# Patient Record
Sex: MALE | Race: WHITE
[De-identification: names, ages, dates, MRNs, and addresses within clinical notes are randomized per-mention and may not be internally consistent; named-entity substitution may affect disease eponyms.]

---

## 2018-02-15 ENCOUNTER — HOSPITAL ENCOUNTER (OUTPATIENT)
Dept: HOSPITAL 95 - ORSCSDS | Age: 83
Discharge: HOME | End: 2018-02-15
Payer: MEDICARE

## 2018-02-15 VITALS — BODY MASS INDEX: 28.35 KG/M2 | HEIGHT: 70 IN | WEIGHT: 198 LBS

## 2018-02-15 DIAGNOSIS — G56.01: Primary | ICD-10-CM

## 2018-02-15 DIAGNOSIS — E11.9: ICD-10-CM

## 2018-02-15 DIAGNOSIS — Z87.891: ICD-10-CM

## 2018-02-15 DIAGNOSIS — Z79.82: ICD-10-CM

## 2018-02-15 DIAGNOSIS — Z79.899: ICD-10-CM

## 2018-02-15 DIAGNOSIS — I10: ICD-10-CM

## 2018-02-15 DIAGNOSIS — Z79.84: ICD-10-CM

## 2018-02-15 PROCEDURE — 01N54ZZ RELEASE MEDIAN NERVE, PERCUTANEOUS ENDOSCOPIC APPROACH: ICD-10-PCS

## 2018-12-16 ENCOUNTER — HOSPITAL ENCOUNTER (INPATIENT)
Dept: HOSPITAL 95 - ER | Age: 83
LOS: 4 days | Discharge: HOME | DRG: 309 | End: 2018-12-20
Attending: HOSPITALIST | Admitting: HOSPITALIST
Payer: MEDICARE

## 2018-12-16 VITALS — BODY MASS INDEX: 24.08 KG/M2 | WEIGHT: 168.21 LBS | HEIGHT: 70 IN

## 2018-12-16 DIAGNOSIS — C91.10: ICD-10-CM

## 2018-12-16 DIAGNOSIS — I11.0: ICD-10-CM

## 2018-12-16 DIAGNOSIS — I50.22: ICD-10-CM

## 2018-12-16 DIAGNOSIS — E03.9: ICD-10-CM

## 2018-12-16 DIAGNOSIS — Z79.899: ICD-10-CM

## 2018-12-16 DIAGNOSIS — I25.10: ICD-10-CM

## 2018-12-16 DIAGNOSIS — D61.818: ICD-10-CM

## 2018-12-16 DIAGNOSIS — D64.9: ICD-10-CM

## 2018-12-16 DIAGNOSIS — Z87.891: ICD-10-CM

## 2018-12-16 DIAGNOSIS — K92.2: ICD-10-CM

## 2018-12-16 DIAGNOSIS — I27.20: ICD-10-CM

## 2018-12-16 DIAGNOSIS — G47.33: ICD-10-CM

## 2018-12-16 DIAGNOSIS — Z88.8: ICD-10-CM

## 2018-12-16 DIAGNOSIS — I48.0: Primary | ICD-10-CM

## 2018-12-16 DIAGNOSIS — E11.9: ICD-10-CM

## 2018-12-16 DIAGNOSIS — K31.5: ICD-10-CM

## 2018-12-16 DIAGNOSIS — E78.5: ICD-10-CM

## 2018-12-16 DIAGNOSIS — J96.11: ICD-10-CM

## 2018-12-16 DIAGNOSIS — K22.2: ICD-10-CM

## 2018-12-16 DIAGNOSIS — I95.9: ICD-10-CM

## 2018-12-16 DIAGNOSIS — I42.9: ICD-10-CM

## 2018-12-16 DIAGNOSIS — Z79.82: ICD-10-CM

## 2018-12-16 LAB
ALBUMIN SERPL BCP-MCNC: 2.7 G/DL (ref 3.4–5)
ALBUMIN/GLOB SERPL: 0.4 {RATIO} (ref 0.8–1.8)
ALT SERPL W P-5'-P-CCNC: 14 U/L (ref 12–78)
ANION GAP SERPL CALCULATED.4IONS-SCNC: 8 MMOL/L (ref 6–16)
AST SERPL W P-5'-P-CCNC: 22 U/L (ref 12–37)
BASOPHILS # BLD AUTO: 0.01 K/MM3 (ref 0–0.23)
BASOPHILS NFR BLD AUTO: 0 % (ref 0–2)
BILIRUB SERPL-MCNC: 0.2 MG/DL (ref 0.1–1)
BUN SERPL-MCNC: 16 MG/DL (ref 8–24)
CALCIUM SERPL-MCNC: 8.3 MG/DL (ref 8.5–10.1)
CHLORIDE SERPL-SCNC: 103 MMOL/L (ref 98–108)
CO2 SERPL-SCNC: 25 MMOL/L (ref 21–32)
CREAT SERPL-MCNC: 1.04 MG/DL (ref 0.6–1.2)
DEPRECATED RDW RBC AUTO: 71.9 FL (ref 35.1–46.3)
EOSINOPHIL # BLD AUTO: 0.07 K/MM3 (ref 0–0.68)
EOSINOPHIL NFR BLD AUTO: 1 % (ref 0–6)
ERYTHROCYTE [DISTWIDTH] IN BLOOD BY AUTOMATED COUNT: 18.5 % (ref 11.7–14.2)
GLOBULIN SER CALC-MCNC: 6.3 G/DL (ref 2.2–4)
GLUCOSE SERPL-MCNC: 160 MG/DL (ref 70–99)
HCT VFR BLD AUTO: 25.1 % (ref 37–53)
HGB BLD-MCNC: 7.6 G/DL (ref 13.5–17.5)
IMM GRANULOCYTES # BLD AUTO: 0.02 K/MM3 (ref 0–0.1)
IMM GRANULOCYTES NFR BLD AUTO: 0 % (ref 0–1)
LYMPHOCYTES # BLD AUTO: 2.77 K/MM3 (ref 0.84–5.2)
LYMPHOCYTES NFR BLD AUTO: 44 % (ref 21–46)
MCHC RBC AUTO-ENTMCNC: 30.3 G/DL (ref 31.5–36.5)
MCV RBC AUTO: 106 FL (ref 80–100)
MONOCYTES # BLD AUTO: 0.36 K/MM3 (ref 0.16–1.47)
MONOCYTES NFR BLD AUTO: 6 % (ref 4–13)
NEUTROPHILS # BLD AUTO: 3.05 K/MM3 (ref 1.96–9.15)
NEUTROPHILS NFR BLD AUTO: 49 % (ref 41–73)
NRBC # BLD AUTO: 0 K/MM3 (ref 0–0.02)
NRBC BLD AUTO-RTO: 0 /100 WBC (ref 0–0.2)
PLATELET # BLD AUTO: 203 K/MM3 (ref 150–400)
POTASSIUM SERPL-SCNC: 4.1 MMOL/L (ref 3.5–5.5)
PROT SERPL-MCNC: 9 G/DL (ref 6.4–8.2)
PROTHROMBIN TIME: 11.3 SEC (ref 9.7–11.5)
SODIUM SERPL-SCNC: 136 MMOL/L (ref 136–145)
TROPONIN I SERPL-MCNC: 0.1 NG/ML (ref 0–0.04)

## 2018-12-16 PROCEDURE — G8978 MOBILITY CURRENT STATUS: HCPCS

## 2018-12-16 PROCEDURE — 5A2204Z RESTORATION OF CARDIAC RHYTHM, SINGLE: ICD-10-PCS | Performed by: FAMILY MEDICINE

## 2018-12-16 PROCEDURE — G0378 HOSPITAL OBSERVATION PER HR: HCPCS

## 2018-12-16 PROCEDURE — G8980 MOBILITY D/C STATUS: HCPCS

## 2018-12-16 PROCEDURE — P9016 RBC LEUKOCYTES REDUCED: HCPCS

## 2018-12-16 PROCEDURE — G8979 MOBILITY GOAL STATUS: HCPCS

## 2018-12-16 PROCEDURE — C9113 INJ PANTOPRAZOLE SODIUM, VIA: HCPCS

## 2018-12-17 LAB
ALBUMIN SERPL BCP-MCNC: 2.6 G/DL (ref 3.4–5)
ALBUMIN/GLOB SERPL: 0.4 {RATIO} (ref 0.8–1.8)
ALT SERPL W P-5'-P-CCNC: 16 U/L (ref 12–78)
ANION GAP SERPL CALCULATED.4IONS-SCNC: 7 MMOL/L (ref 6–16)
AST SERPL W P-5'-P-CCNC: 36 U/L (ref 12–37)
BASOPHILS # BLD AUTO: 0.01 K/MM3 (ref 0–0.23)
BASOPHILS NFR BLD AUTO: 0 % (ref 0–2)
BILIRUB SERPL-MCNC: 0.5 MG/DL (ref 0.1–1)
BUN SERPL-MCNC: 13 MG/DL (ref 8–24)
CALCIUM SERPL-MCNC: 8.6 MG/DL (ref 8.5–10.1)
CHLORIDE SERPL-SCNC: 105 MMOL/L (ref 98–108)
CK SERPL-CCNC: 172 U/L (ref 39–308)
CK SERPL-CCNC: 234 U/L (ref 39–308)
CO2 SERPL-SCNC: 27 MMOL/L (ref 21–32)
CREAT SERPL-MCNC: 0.81 MG/DL (ref 0.6–1.2)
DEPRECATED RDW RBC AUTO: 69.2 FL (ref 35.1–46.3)
DEPRECATED RDW RBC AUTO: 71.3 FL (ref 35.1–46.3)
EOSINOPHIL # BLD AUTO: 0.04 K/MM3 (ref 0–0.68)
EOSINOPHIL NFR BLD AUTO: 1 % (ref 0–6)
ERYTHROCYTE [DISTWIDTH] IN BLOOD BY AUTOMATED COUNT: 18.6 % (ref 11.7–14.2)
ERYTHROCYTE [DISTWIDTH] IN BLOOD BY AUTOMATED COUNT: 18.7 % (ref 11.7–14.2)
GLOBULIN SER CALC-MCNC: 6.7 G/DL (ref 2.2–4)
GLUCOSE SERPL-MCNC: 97 MG/DL (ref 70–99)
HCT VFR BLD AUTO: 26.1 % (ref 37–53)
HCT VFR BLD AUTO: 28.3 % (ref 37–53)
HGB BLD-MCNC: 8.2 G/DL (ref 13.5–17.5)
HGB BLD-MCNC: 8.7 G/DL (ref 13.5–17.5)
IMM GRANULOCYTES # BLD AUTO: 0.02 K/MM3 (ref 0–0.1)
IMM GRANULOCYTES NFR BLD AUTO: 1 % (ref 0–1)
LYMPHOCYTES # BLD AUTO: 1.23 K/MM3 (ref 0.84–5.2)
LYMPHOCYTES NFR BLD AUTO: 34 % (ref 21–46)
MCHC RBC AUTO-ENTMCNC: 30.7 G/DL (ref 31.5–36.5)
MCHC RBC AUTO-ENTMCNC: 31.4 G/DL (ref 31.5–36.5)
MCV RBC AUTO: 103 FL (ref 80–100)
MCV RBC AUTO: 103 FL (ref 80–100)
MONOCYTES # BLD AUTO: 0.16 K/MM3 (ref 0.16–1.47)
MONOCYTES NFR BLD AUTO: 4 % (ref 4–13)
NEUTROPHILS # BLD AUTO: 2.14 K/MM3 (ref 1.96–9.15)
NEUTROPHILS NFR BLD AUTO: 59 % (ref 41–73)
NRBC # BLD AUTO: 0 K/MM3 (ref 0–0.02)
NRBC # BLD AUTO: 0 K/MM3 (ref 0–0.02)
NRBC BLD AUTO-RTO: 0 /100 WBC (ref 0–0.2)
NRBC BLD AUTO-RTO: 0 /100 WBC (ref 0–0.2)
PLATELET # BLD AUTO: 123 K/MM3 (ref 150–400)
PLATELET # BLD AUTO: 141 K/MM3 (ref 150–400)
POTASSIUM SERPL-SCNC: 4.1 MMOL/L (ref 3.5–5.5)
PROT SERPL-MCNC: 9.3 G/DL (ref 6.4–8.2)
SODIUM SERPL-SCNC: 139 MMOL/L (ref 136–145)
SP GR SPEC: 1.01 (ref 1–1.02)
TROPONIN I SERPL-MCNC: 4.71 NG/ML (ref 0–0.04)
TROPONIN I SERPL-MCNC: 8.26 NG/ML (ref 0–0.04)
UROBILINOGEN UR STRIP-MCNC: (no result) MG/DL

## 2018-12-18 LAB
BASOPHILS # BLD AUTO: 0.01 K/MM3 (ref 0–0.23)
BASOPHILS NFR BLD AUTO: 0 % (ref 0–2)
DEPRECATED RDW RBC AUTO: 70.3 FL (ref 35.1–46.3)
EOSINOPHIL # BLD AUTO: 0.1 K/MM3 (ref 0–0.68)
EOSINOPHIL NFR BLD AUTO: 4 % (ref 0–6)
ERYTHROCYTE [DISTWIDTH] IN BLOOD BY AUTOMATED COUNT: 18.6 % (ref 11.7–14.2)
FERRITIN SERPL-MCNC: 108 NG/ML (ref 26–388)
HCT VFR BLD AUTO: 24.7 % (ref 37–53)
HGB BLD-MCNC: 7.5 G/DL (ref 13.5–17.5)
IMM GRANULOCYTES # BLD AUTO: 0.01 K/MM3 (ref 0–0.1)
IMM GRANULOCYTES NFR BLD AUTO: 0 % (ref 0–1)
LYMPHOCYTES # BLD AUTO: 0.96 K/MM3 (ref 0.84–5.2)
LYMPHOCYTES NFR BLD AUTO: 36 % (ref 21–46)
MCHC RBC AUTO-ENTMCNC: 30.4 G/DL (ref 31.5–36.5)
MCV RBC AUTO: 103 FL (ref 80–100)
MONOCYTES # BLD AUTO: 0.16 K/MM3 (ref 0.16–1.47)
MONOCYTES NFR BLD AUTO: 6 % (ref 4–13)
NEUTROPHILS # BLD AUTO: 1.42 K/MM3 (ref 1.96–9.15)
NEUTROPHILS NFR BLD AUTO: 53 % (ref 41–73)
NRBC # BLD AUTO: 0 K/MM3 (ref 0–0.02)
NRBC BLD AUTO-RTO: 0 /100 WBC (ref 0–0.2)
PLATELET # BLD AUTO: 105 K/MM3 (ref 150–400)
TIBC SERPL-MCNC: 270 UG/DL (ref 250–450)

## 2018-12-18 PROCEDURE — 0DBA8ZX EXCISION OF JEJUNUM, VIA NATURAL OR ARTIFICIAL OPENING ENDOSCOPIC, DIAGNOSTIC: ICD-10-PCS | Performed by: INTERNAL MEDICINE

## 2018-12-19 LAB
BASOPHILS # BLD AUTO: 0.01 K/MM3 (ref 0–0.23)
BASOPHILS # BLD AUTO: 0.01 K/MM3 (ref 0–0.23)
BASOPHILS NFR BLD AUTO: 0 % (ref 0–2)
BASOPHILS NFR BLD AUTO: 0 % (ref 0–2)
DEPRECATED RDW RBC AUTO: 67.8 FL (ref 35.1–46.3)
DEPRECATED RDW RBC AUTO: 71.7 FL (ref 35.1–46.3)
EOSINOPHIL # BLD AUTO: 0.06 K/MM3 (ref 0–0.68)
EOSINOPHIL # BLD AUTO: 0.06 K/MM3 (ref 0–0.68)
EOSINOPHIL NFR BLD AUTO: 2 % (ref 0–6)
EOSINOPHIL NFR BLD AUTO: 2 % (ref 0–6)
ERYTHROCYTE [DISTWIDTH] IN BLOOD BY AUTOMATED COUNT: 18.2 % (ref 11.7–14.2)
ERYTHROCYTE [DISTWIDTH] IN BLOOD BY AUTOMATED COUNT: 20.3 % (ref 11.7–14.2)
HCT VFR BLD AUTO: 25.4 % (ref 37–53)
HCT VFR BLD AUTO: 29.1 % (ref 37–53)
HGB BLD-MCNC: 7.9 G/DL (ref 13.5–17.5)
HGB BLD-MCNC: 9.4 G/DL (ref 13.5–17.5)
IMM GRANULOCYTES # BLD AUTO: 0.01 K/MM3 (ref 0–0.1)
IMM GRANULOCYTES # BLD AUTO: 0.01 K/MM3 (ref 0–0.1)
IMM GRANULOCYTES NFR BLD AUTO: 0 % (ref 0–1)
IMM GRANULOCYTES NFR BLD AUTO: 0 % (ref 0–1)
LYMPHOCYTES # BLD AUTO: 0.7 K/MM3 (ref 0.84–5.2)
LYMPHOCYTES # BLD AUTO: 1.34 K/MM3 (ref 0.84–5.2)
LYMPHOCYTES NFR BLD AUTO: 24 % (ref 21–46)
LYMPHOCYTES NFR BLD AUTO: 36 % (ref 21–46)
MCHC RBC AUTO-ENTMCNC: 31.1 G/DL (ref 31.5–36.5)
MCHC RBC AUTO-ENTMCNC: 32.3 G/DL (ref 31.5–36.5)
MCV RBC AUTO: 101 FL (ref 80–100)
MCV RBC AUTO: 97 FL (ref 80–100)
MONOCYTES # BLD AUTO: 0.21 K/MM3 (ref 0.16–1.47)
MONOCYTES # BLD AUTO: 0.26 K/MM3 (ref 0.16–1.47)
MONOCYTES NFR BLD AUTO: 7 % (ref 4–13)
MONOCYTES NFR BLD AUTO: 7 % (ref 4–13)
NEUTROPHILS # BLD AUTO: 1.96 K/MM3 (ref 1.96–9.15)
NEUTROPHILS # BLD AUTO: 2 K/MM3 (ref 1.96–9.15)
NEUTROPHILS NFR BLD AUTO: 54 % (ref 41–73)
NEUTROPHILS NFR BLD AUTO: 67 % (ref 41–73)
NRBC # BLD AUTO: 0 K/MM3 (ref 0–0.02)
NRBC # BLD AUTO: 0 K/MM3 (ref 0–0.02)
NRBC BLD AUTO-RTO: 0 /100 WBC (ref 0–0.2)
NRBC BLD AUTO-RTO: 0 /100 WBC (ref 0–0.2)
PLATELET # BLD AUTO: 111 K/MM3 (ref 150–400)
PLATELET # BLD AUTO: 111 K/MM3 (ref 150–400)

## 2018-12-20 LAB
BASOPHILS # BLD AUTO: 0.01 K/MM3 (ref 0–0.23)
BASOPHILS NFR BLD AUTO: 0 % (ref 0–2)
DEPRECATED RDW RBC AUTO: 71.4 FL (ref 35.1–46.3)
EOSINOPHIL # BLD AUTO: 0.08 K/MM3 (ref 0–0.68)
EOSINOPHIL NFR BLD AUTO: 2 % (ref 0–6)
ERYTHROCYTE [DISTWIDTH] IN BLOOD BY AUTOMATED COUNT: 20.1 % (ref 11.7–14.2)
HCT VFR BLD AUTO: 30.7 % (ref 37–53)
HGB BLD-MCNC: 9.7 G/DL (ref 13.5–17.5)
IMM GRANULOCYTES # BLD AUTO: 0.01 K/MM3 (ref 0–0.1)
IMM GRANULOCYTES NFR BLD AUTO: 0 % (ref 0–1)
LYMPHOCYTES # BLD AUTO: 1.08 K/MM3 (ref 0.84–5.2)
LYMPHOCYTES NFR BLD AUTO: 32 % (ref 21–46)
MCHC RBC AUTO-ENTMCNC: 31.6 G/DL (ref 31.5–36.5)
MCV RBC AUTO: 97 FL (ref 80–100)
MONOCYTES # BLD AUTO: 0.25 K/MM3 (ref 0.16–1.47)
MONOCYTES NFR BLD AUTO: 7 % (ref 4–13)
NEUTROPHILS # BLD AUTO: 1.95 K/MM3 (ref 1.96–9.15)
NEUTROPHILS NFR BLD AUTO: 58 % (ref 41–73)
NRBC # BLD AUTO: 0 K/MM3 (ref 0–0.02)
NRBC BLD AUTO-RTO: 0 /100 WBC (ref 0–0.2)
PLATELET # BLD AUTO: 123 K/MM3 (ref 150–400)

## 2018-12-21 ENCOUNTER — HOSPITAL ENCOUNTER (INPATIENT)
Dept: HOSPITAL 95 - ER | Age: 83
LOS: 3 days | Discharge: ANOTHER HOSPITAL | DRG: 281 | End: 2018-12-24
Attending: FAMILY MEDICINE | Admitting: FAMILY MEDICINE
Payer: MEDICARE

## 2018-12-21 VITALS — WEIGHT: 164.24 LBS | BODY MASS INDEX: 23.51 KG/M2 | HEIGHT: 70 IN

## 2018-12-21 DIAGNOSIS — I47.2: ICD-10-CM

## 2018-12-21 DIAGNOSIS — I48.0: ICD-10-CM

## 2018-12-21 DIAGNOSIS — I21.4: Primary | ICD-10-CM

## 2018-12-21 DIAGNOSIS — I25.10: ICD-10-CM

## 2018-12-21 DIAGNOSIS — E78.5: ICD-10-CM

## 2018-12-21 DIAGNOSIS — I42.0: ICD-10-CM

## 2018-12-21 DIAGNOSIS — I50.22: ICD-10-CM

## 2018-12-21 DIAGNOSIS — I48.91: ICD-10-CM

## 2018-12-21 DIAGNOSIS — I95.1: ICD-10-CM

## 2018-12-21 DIAGNOSIS — I11.0: ICD-10-CM

## 2018-12-21 DIAGNOSIS — C91.90: ICD-10-CM

## 2018-12-21 DIAGNOSIS — Z87.891: ICD-10-CM

## 2018-12-21 DIAGNOSIS — E03.9: ICD-10-CM

## 2018-12-21 LAB
ALBUMIN SERPL BCP-MCNC: 2.7 G/DL (ref 3.4–5)
ALBUMIN/GLOB SERPL: 0.4 {RATIO} (ref 0.8–1.8)
ALT SERPL W P-5'-P-CCNC: 16 U/L (ref 12–78)
ANION GAP SERPL CALCULATED.4IONS-SCNC: 4 MMOL/L (ref 6–16)
AST SERPL W P-5'-P-CCNC: 32 U/L (ref 12–37)
BASOPHILS # BLD AUTO: 0.02 K/MM3 (ref 0–0.23)
BASOPHILS NFR BLD AUTO: 0 % (ref 0–2)
BILIRUB SERPL-MCNC: 0.4 MG/DL (ref 0.1–1)
BUN SERPL-MCNC: 21 MG/DL (ref 8–24)
CALCIUM SERPL-MCNC: 8.7 MG/DL (ref 8.5–10.1)
CHLORIDE SERPL-SCNC: 94 MMOL/L (ref 98–108)
CO2 SERPL-SCNC: 32 MMOL/L (ref 21–32)
CREAT SERPL-MCNC: 1.04 MG/DL (ref 0.6–1.2)
DEPRECATED RDW RBC AUTO: 68.7 FL (ref 35.1–46.3)
EOSINOPHIL # BLD AUTO: 0.06 K/MM3 (ref 0–0.68)
EOSINOPHIL NFR BLD AUTO: 1 % (ref 0–6)
ERYTHROCYTE [DISTWIDTH] IN BLOOD BY AUTOMATED COUNT: 19.5 % (ref 11.7–14.2)
GLOBULIN SER CALC-MCNC: 6.8 G/DL (ref 2.2–4)
GLUCOSE SERPL-MCNC: 128 MG/DL (ref 70–99)
HCT VFR BLD AUTO: 31.3 % (ref 37–53)
HGB BLD-MCNC: 9.9 G/DL (ref 13.5–17.5)
IMM GRANULOCYTES # BLD AUTO: 0.04 K/MM3 (ref 0–0.1)
IMM GRANULOCYTES NFR BLD AUTO: 1 % (ref 0–1)
LYMPHOCYTES # BLD AUTO: 1.87 K/MM3 (ref 0.84–5.2)
LYMPHOCYTES NFR BLD AUTO: 35 % (ref 21–46)
MCHC RBC AUTO-ENTMCNC: 31.6 G/DL (ref 31.5–36.5)
MCV RBC AUTO: 97 FL (ref 80–100)
MONOCYTES # BLD AUTO: 0.3 K/MM3 (ref 0.16–1.47)
MONOCYTES NFR BLD AUTO: 6 % (ref 4–13)
NEUTROPHILS # BLD AUTO: 3.05 K/MM3 (ref 1.96–9.15)
NEUTROPHILS NFR BLD AUTO: 57 % (ref 41–73)
NRBC # BLD AUTO: 0 K/MM3 (ref 0–0.02)
NRBC BLD AUTO-RTO: 0 /100 WBC (ref 0–0.2)
PLATELET # BLD AUTO: 162 K/MM3 (ref 150–400)
POTASSIUM SERPL-SCNC: 3.8 MMOL/L (ref 3.5–5.5)
PROT SERPL-MCNC: 9.5 G/DL (ref 6.4–8.2)
SODIUM SERPL-SCNC: 130 MMOL/L (ref 136–145)

## 2018-12-21 PROCEDURE — C1769 GUIDE WIRE: HCPCS

## 2018-12-21 PROCEDURE — C1894 INTRO/SHEATH, NON-LASER: HCPCS

## 2018-12-22 LAB
ALBUMIN SERPL BCP-MCNC: 2.5 G/DL (ref 3.4–5)
ALBUMIN/GLOB SERPL: 0.4 {RATIO} (ref 0.8–1.8)
ALT SERPL W P-5'-P-CCNC: 15 U/L (ref 12–78)
ANION GAP SERPL CALCULATED.4IONS-SCNC: 4 MMOL/L (ref 6–16)
ANION GAP SERPL CALCULATED.4IONS-SCNC: 5 MMOL/L (ref 6–16)
AST SERPL W P-5'-P-CCNC: 24 U/L (ref 12–37)
BASOPHILS # BLD AUTO: 0.01 K/MM3 (ref 0–0.23)
BASOPHILS NFR BLD AUTO: 0 % (ref 0–2)
BILIRUB SERPL-MCNC: 0.3 MG/DL (ref 0.1–1)
BUN SERPL-MCNC: 15 MG/DL (ref 8–24)
BUN SERPL-MCNC: 17 MG/DL (ref 8–24)
CALCIUM SERPL-MCNC: 8.4 MG/DL (ref 8.5–10.1)
CALCIUM SERPL-MCNC: 8.4 MG/DL (ref 8.5–10.1)
CHLORIDE SERPL-SCNC: 100 MMOL/L (ref 98–108)
CHLORIDE SERPL-SCNC: 101 MMOL/L (ref 98–108)
CO2 SERPL-SCNC: 30 MMOL/L (ref 21–32)
CO2 SERPL-SCNC: 30 MMOL/L (ref 21–32)
CREAT SERPL-MCNC: 0.76 MG/DL (ref 0.6–1.2)
CREAT SERPL-MCNC: 0.82 MG/DL (ref 0.6–1.2)
DEPRECATED RDW RBC AUTO: 70.8 FL (ref 35.1–46.3)
EOSINOPHIL # BLD AUTO: 0.07 K/MM3 (ref 0–0.68)
EOSINOPHIL NFR BLD AUTO: 2 % (ref 0–6)
ERYTHROCYTE [DISTWIDTH] IN BLOOD BY AUTOMATED COUNT: 19.1 % (ref 11.7–14.2)
GLOBULIN SER CALC-MCNC: 6.6 G/DL (ref 2.2–4)
GLUCOSE SERPL-MCNC: 109 MG/DL (ref 70–99)
GLUCOSE SERPL-MCNC: 99 MG/DL (ref 70–99)
HCT VFR BLD AUTO: 31.8 % (ref 37–53)
HGB BLD-MCNC: 9.8 G/DL (ref 13.5–17.5)
IMM GRANULOCYTES # BLD AUTO: 0.01 K/MM3 (ref 0–0.1)
IMM GRANULOCYTES NFR BLD AUTO: 0 % (ref 0–1)
LYMPHOCYTES # BLD AUTO: 1.06 K/MM3 (ref 0.84–5.2)
LYMPHOCYTES NFR BLD AUTO: 34 % (ref 21–46)
MAGNESIUM SERPL-MCNC: 2 MG/DL (ref 1.6–2.4)
MCHC RBC AUTO-ENTMCNC: 30.8 G/DL (ref 31.5–36.5)
MCV RBC AUTO: 99 FL (ref 80–100)
MONOCYTES # BLD AUTO: 0.17 K/MM3 (ref 0.16–1.47)
MONOCYTES NFR BLD AUTO: 5 % (ref 4–13)
NEUTROPHILS # BLD AUTO: 1.83 K/MM3 (ref 1.96–9.15)
NEUTROPHILS NFR BLD AUTO: 58 % (ref 41–73)
NRBC # BLD AUTO: 0 K/MM3 (ref 0–0.02)
NRBC BLD AUTO-RTO: 0 /100 WBC (ref 0–0.2)
PLATELET # BLD AUTO: 123 K/MM3 (ref 150–400)
POTASSIUM SERPL-SCNC: 4 MMOL/L (ref 3.5–5.5)
POTASSIUM SERPL-SCNC: 4.5 MMOL/L (ref 3.5–5.5)
PROT SERPL-MCNC: 9.1 G/DL (ref 6.4–8.2)
SODIUM SERPL-SCNC: 134 MMOL/L (ref 136–145)
SODIUM SERPL-SCNC: 136 MMOL/L (ref 136–145)

## 2018-12-23 LAB
ANION GAP SERPL CALCULATED.4IONS-SCNC: 5 MMOL/L (ref 6–16)
BASOPHILS # BLD AUTO: 0.01 K/MM3 (ref 0–0.23)
BASOPHILS NFR BLD AUTO: 0 % (ref 0–2)
BUN SERPL-MCNC: 15 MG/DL (ref 8–24)
CALCIUM SERPL-MCNC: 8.6 MG/DL (ref 8.5–10.1)
CHLORIDE SERPL-SCNC: 100 MMOL/L (ref 98–108)
CO2 SERPL-SCNC: 27 MMOL/L (ref 21–32)
CREAT SERPL-MCNC: 0.74 MG/DL (ref 0.6–1.2)
DEPRECATED RDW RBC AUTO: 68.6 FL (ref 35.1–46.3)
EOSINOPHIL # BLD AUTO: 0.06 K/MM3 (ref 0–0.68)
EOSINOPHIL NFR BLD AUTO: 2 % (ref 0–6)
ERYTHROCYTE [DISTWIDTH] IN BLOOD BY AUTOMATED COUNT: 18.8 % (ref 11.7–14.2)
GLUCOSE SERPL-MCNC: 116 MG/DL (ref 70–99)
HCT VFR BLD AUTO: 30.7 % (ref 37–53)
HGB BLD-MCNC: 9.6 G/DL (ref 13.5–17.5)
IMM GRANULOCYTES # BLD AUTO: 0.01 K/MM3 (ref 0–0.1)
IMM GRANULOCYTES NFR BLD AUTO: 0 % (ref 0–1)
LYMPHOCYTES # BLD AUTO: 1.23 K/MM3 (ref 0.84–5.2)
LYMPHOCYTES NFR BLD AUTO: 34 % (ref 21–46)
MAGNESIUM SERPL-MCNC: 2 MG/DL (ref 1.6–2.4)
MCHC RBC AUTO-ENTMCNC: 31.3 G/DL (ref 31.5–36.5)
MCV RBC AUTO: 100 FL (ref 80–100)
MONOCYTES # BLD AUTO: 0.22 K/MM3 (ref 0.16–1.47)
MONOCYTES NFR BLD AUTO: 6 % (ref 4–13)
NEUTROPHILS # BLD AUTO: 2.07 K/MM3 (ref 1.96–9.15)
NEUTROPHILS NFR BLD AUTO: 57 % (ref 41–73)
NRBC # BLD AUTO: 0 K/MM3 (ref 0–0.02)
NRBC BLD AUTO-RTO: 0 /100 WBC (ref 0–0.2)
PLATELET # BLD AUTO: 114 K/MM3 (ref 150–400)
POTASSIUM SERPL-SCNC: 4.5 MMOL/L (ref 3.5–5.5)
SODIUM SERPL-SCNC: 132 MMOL/L (ref 136–145)

## 2018-12-24 LAB
ALBUMIN SERPL BCP-MCNC: 2.7 G/DL (ref 3.4–5)
ANION GAP SERPL CALCULATED.4IONS-SCNC: 5 MMOL/L (ref 6–16)
BUN SERPL-MCNC: 18 MG/DL (ref 8–24)
CALCIUM SERPL-MCNC: 8.8 MG/DL (ref 8.5–10.1)
CHLORIDE SERPL-SCNC: 101 MMOL/L (ref 98–108)
CO2 SERPL-SCNC: 29 MMOL/L (ref 21–32)
CREAT SERPL-MCNC: 0.77 MG/DL (ref 0.6–1.2)
GLUCOSE SERPL-MCNC: 108 MG/DL (ref 70–99)
MAGNESIUM SERPL-MCNC: 2.4 MG/DL (ref 1.6–2.4)
PHOSPHATE SERPL-MCNC: 3.4 MG/DL (ref 2.5–4.9)
POTASSIUM SERPL-SCNC: 4.5 MMOL/L (ref 3.5–5.5)
SODIUM SERPL-SCNC: 135 MMOL/L (ref 136–145)

## 2018-12-24 PROCEDURE — 4A023N7 MEASUREMENT OF CARDIAC SAMPLING AND PRESSURE, LEFT HEART, PERCUTANEOUS APPROACH: ICD-10-PCS | Performed by: INTERNAL MEDICINE

## 2018-12-24 PROCEDURE — B2111ZZ FLUOROSCOPY OF MULTIPLE CORONARY ARTERIES USING LOW OSMOLAR CONTRAST: ICD-10-PCS | Performed by: INTERNAL MEDICINE

## 2019-01-07 ENCOUNTER — HOSPITAL ENCOUNTER (INPATIENT)
Dept: HOSPITAL 95 - ER | Age: 84
LOS: 3 days | Discharge: NURSING, REHAB OR LONG-TERM CARE | DRG: 378 | End: 2019-01-10
Attending: HOSPITALIST | Admitting: HOSPITALIST
Payer: MEDICARE

## 2019-01-07 VITALS — WEIGHT: 163.14 LBS | HEIGHT: 70 IN | BODY MASS INDEX: 23.36 KG/M2

## 2019-01-07 DIAGNOSIS — D62: ICD-10-CM

## 2019-01-07 DIAGNOSIS — I48.2: ICD-10-CM

## 2019-01-07 DIAGNOSIS — D63.0: ICD-10-CM

## 2019-01-07 DIAGNOSIS — I25.5: ICD-10-CM

## 2019-01-07 DIAGNOSIS — E03.9: ICD-10-CM

## 2019-01-07 DIAGNOSIS — E87.1: ICD-10-CM

## 2019-01-07 DIAGNOSIS — G47.33: ICD-10-CM

## 2019-01-07 DIAGNOSIS — I50.22: ICD-10-CM

## 2019-01-07 DIAGNOSIS — Z95.5: ICD-10-CM

## 2019-01-07 DIAGNOSIS — I95.9: ICD-10-CM

## 2019-01-07 DIAGNOSIS — C91.90: ICD-10-CM

## 2019-01-07 DIAGNOSIS — K92.2: Primary | ICD-10-CM

## 2019-01-07 DIAGNOSIS — Z79.82: ICD-10-CM

## 2019-01-07 DIAGNOSIS — I25.10: ICD-10-CM

## 2019-01-07 DIAGNOSIS — D61.818: ICD-10-CM

## 2019-01-07 LAB
ANION GAP SERPL CALCULATED.4IONS-SCNC: 6 MMOL/L (ref 6–16)
BASOPHILS # BLD AUTO: 0 K/MM3 (ref 0–0.23)
BASOPHILS NFR BLD AUTO: 0 % (ref 0–2)
BUN SERPL-MCNC: 19 MG/DL (ref 8–24)
CALCIUM SERPL-MCNC: 8.1 MG/DL (ref 8.5–10.1)
CHLORIDE SERPL-SCNC: 97 MMOL/L (ref 98–108)
CO2 SERPL-SCNC: 28 MMOL/L (ref 21–32)
CREAT SERPL-MCNC: 0.81 MG/DL (ref 0.6–1.2)
DEPRECATED RDW RBC AUTO: 72.1 FL (ref 35.1–46.3)
EOSINOPHIL # BLD AUTO: 0.03 K/MM3 (ref 0–0.68)
EOSINOPHIL NFR BLD AUTO: 1 % (ref 0–6)
ERYTHROCYTE [DISTWIDTH] IN BLOOD BY AUTOMATED COUNT: 19.3 % (ref 11.7–14.2)
FERRITIN SERPL-MCNC: 331 NG/ML (ref 26–388)
GLUCOSE SERPL-MCNC: 153 MG/DL (ref 70–99)
HCT VFR BLD AUTO: 14.3 % (ref 37–53)
HCT VFR BLD AUTO: 27.8 % (ref 37–53)
HGB BLD-MCNC: 4.5 G/DL (ref 13.5–17.5)
HGB BLD-MCNC: 9.1 G/DL (ref 13.5–17.5)
IMM GRANULOCYTES # BLD AUTO: 0.04 K/MM3 (ref 0–0.1)
IMM GRANULOCYTES NFR BLD AUTO: 1 % (ref 0–1)
LYMPHOCYTES # BLD AUTO: 1.21 K/MM3 (ref 0.84–5.2)
LYMPHOCYTES NFR BLD AUTO: 25 % (ref 21–46)
MCHC RBC AUTO-ENTMCNC: 31.5 G/DL (ref 31.5–36.5)
MCV RBC AUTO: 103 FL (ref 80–100)
MONOCYTES # BLD AUTO: 0.22 K/MM3 (ref 0.16–1.47)
MONOCYTES NFR BLD AUTO: 5 % (ref 4–13)
NEUTROPHILS # BLD AUTO: 3.34 K/MM3 (ref 1.96–9.15)
NEUTROPHILS NFR BLD AUTO: 69 % (ref 41–73)
NRBC # BLD AUTO: 0 K/MM3 (ref 0–0.02)
NRBC BLD AUTO-RTO: 0 /100 WBC (ref 0–0.2)
PLATELET # BLD AUTO: 177 K/MM3 (ref 150–400)
POTASSIUM SERPL-SCNC: 4.4 MMOL/L (ref 3.5–5.5)
PROTHROMBIN TIME: 11.7 SEC (ref 9.7–11.5)
SODIUM SERPL-SCNC: 131 MMOL/L (ref 136–145)
TIBC SERPL-MCNC: 224 UG/DL (ref 250–450)

## 2019-01-07 PROCEDURE — P9016 RBC LEUKOCYTES REDUCED: HCPCS

## 2019-01-07 PROCEDURE — 30233N1 TRANSFUSION OF NONAUTOLOGOUS RED BLOOD CELLS INTO PERIPHERAL VEIN, PERCUTANEOUS APPROACH: ICD-10-PCS | Performed by: FAMILY MEDICINE

## 2019-01-07 PROCEDURE — C9113 INJ PANTOPRAZOLE SODIUM, VIA: HCPCS

## 2019-01-07 NOTE — NUR
VS STABLE. DENIES PAIN OR SOB. CONGESTED COUGH. NSR. DR. RAGLAND HERE-ASSESSED
PT. PLANS FOR CLEAR LIQUIDS AND MONITOR BLOOD COUNT.  THIRD UNIT PACKED CELLS
STARTED. NO S/S REACTION. FAMILY AT BEDSIDE, QUESTIONS ANSWERED.

## 2019-01-07 NOTE — NUR
ASSUMING CARE
RECEIVED PT REPROT FROM JOSIANE GARCIA IN THE ICU. PT IS ALERT AND ORIENTED AT THE
TIME CARE ASSUMED. PT IS ADMITTED DUE TO A GI BLEED AND ANEMIA.
AT THE TIME OF PT REPORT PT IS RECEIVING UNIT OF PRBC'S. AS PER REPORT PT IS
RECEIVING 3RD OF 3 UNITS OF ORDERED UNITS OF BLOOD.
PT DENIES ANY PAIN OR DISCOMFORT AT THE TIME OF ASSESSMENT.
PT IS ON ROOM AIR WITH SPO2 IN THE  RANGE. PT HR IS MAINTAINING IN THE
60'S AND BP IS IN THE HIGH 90'S 'S.
PT HAS A LARGE HEMATOMA AND BRUISING NOTED TO THE RIGHT GROIN. AS PER PT IT IS
FROM A PREVIOUS CARDIAC CATH PROCEDURE THROUGH RIGHT FEMORAL ACCESS PRIOR TO
THIS ADMISSION. PT REPORTS SOME TENDERNESS ON PALPATION TO THE RIGHT GROIN.
PT IS RECEIVING NS AT 25ML/HR AT THE TIME CARE WAS ASSUMED. ORDER FOR NS IS TO
RUN AT 100ML/HR. RATE REDUCED TO 25ML/HR THROUGH BLOOD ADMINISTRATION DUE TO
CLINICAL JUDGMENT. RATE INCREASED TO 100ML/HR AS PER ORDERS AFTER COMPLETION
OF BLOOD INFUSION. PT IS ALSO RECEIVING PROTONIX GTT AT 10ML/HR.
PT IS ABLE TO USE URINAL AND REPOSITION SELF UNASSISTED.
ASSUMED CARE OF PT AT THE TIME OF SHIFT REPORT. WILL CONTINUE TO MONITOR PT.

## 2019-01-07 NOTE — NUR
ADMISSION-
PT ADMITTED FROM ER ON [facility]. AWAKE, ALERT, COOPERATIVE. COLOR PALE, SKIN
W/D, DENIES DISTRESS.  LUNGS CLEAR, NSR, SBP 'S. FIRST UNIT BLOOD
INFUSING, NO S/S REACTION, SECOND UNIT STARTED. PT CONSENT TO RECEIVING BLOOD.
RIGHT AC AND LEFT AC IV BOTH INTACT.  PROTONIX GTT AT 10 CC/HR. NO N/V, STATES
HAS HAD HARD BLACK STOOL FOR LAST FEW DAYS.  ABLE TO STAND TO TRANSFER TO BED.
ORIENTED TO ENVIRONMENT, QUESTIONS ANSWERED. RIGHT GROIN BRUISED, HARDENED
HEMATOMA, SITE TENDER.  VOIDING PER URINAL. UPDATE TO DR. EARLY.

## 2019-01-08 LAB
ANION GAP SERPL CALCULATED.4IONS-SCNC: 6 MMOL/L (ref 6–16)
BASOPHILS # BLD AUTO: 0 K/MM3 (ref 0–0.23)
BASOPHILS NFR BLD AUTO: 0 % (ref 0–2)
BUN SERPL-MCNC: 14 MG/DL (ref 8–24)
CALCIUM SERPL-MCNC: 7.7 MG/DL (ref 8.5–10.1)
CHLORIDE SERPL-SCNC: 105 MMOL/L (ref 98–108)
CO2 SERPL-SCNC: 25 MMOL/L (ref 21–32)
CREAT SERPL-MCNC: 0.86 MG/DL (ref 0.6–1.2)
DEPRECATED RDW RBC AUTO: 65.2 FL (ref 35.1–46.3)
EOSINOPHIL # BLD AUTO: 0.07 K/MM3 (ref 0–0.68)
EOSINOPHIL NFR BLD AUTO: 3 % (ref 0–6)
ERYTHROCYTE [DISTWIDTH] IN BLOOD BY AUTOMATED COUNT: 19.2 % (ref 11.7–14.2)
GLUCOSE SERPL-MCNC: 85 MG/DL (ref 70–99)
HCT VFR BLD AUTO: 27.1 % (ref 37–53)
HCT VFR BLD AUTO: 27.2 % (ref 37–53)
HCT VFR BLD AUTO: 27.3 % (ref 37–53)
HGB BLD-MCNC: 8.7 G/DL (ref 13.5–17.5)
HGB BLD-MCNC: 8.7 G/DL (ref 13.5–17.5)
HGB BLD-MCNC: 8.8 G/DL (ref 13.5–17.5)
IMM GRANULOCYTES # BLD AUTO: 0.01 K/MM3 (ref 0–0.1)
IMM GRANULOCYTES NFR BLD AUTO: 0 % (ref 0–1)
LYMPHOCYTES # BLD AUTO: 0.86 K/MM3 (ref 0.84–5.2)
LYMPHOCYTES NFR BLD AUTO: 31 % (ref 21–46)
MCHC RBC AUTO-ENTMCNC: 32.5 G/DL (ref 31.5–36.5)
MCV RBC AUTO: 96 FL (ref 80–100)
MONOCYTES # BLD AUTO: 0.09 K/MM3 (ref 0.16–1.47)
MONOCYTES NFR BLD AUTO: 3 % (ref 4–13)
NEUTROPHILS # BLD AUTO: 1.79 K/MM3 (ref 1.96–9.15)
NEUTROPHILS NFR BLD AUTO: 63 % (ref 41–73)
NRBC # BLD AUTO: 0 K/MM3 (ref 0–0.02)
NRBC BLD AUTO-RTO: 0 /100 WBC (ref 0–0.2)
PLATELET # BLD AUTO: 122 K/MM3 (ref 150–400)
POTASSIUM SERPL-SCNC: 4 MMOL/L (ref 3.5–5.5)
SODIUM SERPL-SCNC: 136 MMOL/L (ref 136–145)

## 2019-01-08 NOTE — NUR
DR. EARLY REQUESTING DR. HUTCHINSON TO CONSULT BECAUSE OF ANEMIA AND HISTORY OF
CLL.  OFFICE NOTIFIED OF CONSULT.

## 2019-01-08 NOTE — NUR
SUMMARY
PATIENT CHANGED TO PCU STATUS EARLIER TODAY. DR RAGLAND ADVANCED PATIENTS DIET.
PLAN TO TREAT ANEMIA MEDICALLY AT THIS TIME.  DR. HONORIO LANCASTER TO CONSULT,
PATIENT HAS SEEN HIM IN THE PAST, INSTEAD OF JASPER. PATIENT UP TO CHAIR TODAY
WITH MINIMAL ASSIST AND ABLE TO AMBULATE WITH WALKER. PATIENT ALERT AND
ORIENTED. ABLE TO FOLLOW DIRECTION. UP TO COMMODE X3 TODAY. PATIENT HAD 2
BM'S. BOTH DARK BROWN WITH SPOTTING OF BLOOD. SCANT BLOOD NOTED WITH PERICARE
AFTER STOOLING. PATIENT DENIES NAUSEA. VSS. PATIENT HAS AREA OF BRUISING OF R
GROIN.  BRUISING IN VARIOUS SHADES OF PURPLE AND YELLOWING OF EDGES. PATIENT
HAD A BALLOON PUMP PLACED THERE DURING A PRIOR ADMISSION RECENTLY. WILL
CONTINUE TO MONITOR FOR SIGNS OF BLEEDING. WILL GIVE REPORT TO THE ONCOMING
SHIFT WHEN AVAILABLE.

## 2019-01-08 NOTE — NUR
patient up to chair for about 1 hour. patient able to walk with walker and
minimal assist from staff. patient had a soft semi formed stool that was very
dark brown.  patient states that he has had to disimpact himself the last few
weeks at the care center. patient takes iron at the facility. will continue to
monitor for signs of bleeding.

## 2019-01-08 NOTE — NUR
DR. HONORIO LANCASTER OFFICE CALLED. STATING DR. LANCASTER WILL SEE THE PATIENT AS AN
OUTPATIENT IN A MONTH AND PLANS TO DO FOLLOW UP LABS PRIOR TO APPOINTMENT.

## 2019-01-08 NOTE — NUR
ASSUMED CARE
ASSUMED CARE OF PATIENT. PATIENT AWAKE AND ALERT. DENIES ANY PAIN AT THIS
TIME. PLAN TO CONTINUE TO MONITOR FOR SIGNS OF BLEEDING. WILL ENCOURAGE
MOBILIZING AS TOLERATED. WILL NOTIFY PHYSICIANS OF ANY CHANGES.

## 2019-01-08 NOTE — NUR
SHIFT SUMMARY NOTE
PT HAS REMAINED ALERT AND ORIENTED THROUGH THE NIGHT WHILE AWAKE. PT H/H
INCREASED TO GREATER THAN 8.0 AFTER INFUSION OF 3 UNITS PRBC'S. PT REMAINS ON
PROTONIX GTT AT 10ML/HR AND NS AT 100ML/HR.
PT HAS REPOSITIONED SELF THROUGH THE NIGHT WITH OCCASIONAL ENCOURAGEMENT
AND/OR ASSISTANCE.
PT HAS BEEN OCCASIONALLY HYPOTENSIVE WITH SOME BP'S NOTED TO BE IN THE 80'S.
OTHERWISE PT BP HAS MAINTAINED IN THE 'S. 2L O2 VIA NC APPLEID AS PT
WOULD OCCASIONALLY DESATURATE WHILE ASLEEP INTO THE 80'S. WHILE AWAKE PT WILL
MAINTAINE IN THE HIGH 90'S ON ROOM AIR.
PT HAS BEEN ABLE TO USE URINAL THROUGH THE NIGHT WITH MINIMAL ASSISTANCE.
WILL REPORT OFF TO ONCOMING DAY SHIFT NURSE.

## 2019-01-09 LAB
ALBUMIN SERPL BCP-MCNC: 2 G/DL (ref 3.4–5)
ANION GAP SERPL CALCULATED.4IONS-SCNC: 4 MMOL/L (ref 6–16)
BASOPHILS # BLD AUTO: 0.01 K/MM3 (ref 0–0.23)
BASOPHILS NFR BLD AUTO: 0 % (ref 0–2)
BUN SERPL-MCNC: 10 MG/DL (ref 8–24)
CALCIUM SERPL-MCNC: 7.7 MG/DL (ref 8.5–10.1)
CHLORIDE SERPL-SCNC: 106 MMOL/L (ref 98–108)
CO2 SERPL-SCNC: 27 MMOL/L (ref 21–32)
CREAT SERPL-MCNC: 0.83 MG/DL (ref 0.6–1.2)
DEPRECATED RDW RBC AUTO: 67.6 FL (ref 35.1–46.3)
EOSINOPHIL # BLD AUTO: 0.07 K/MM3 (ref 0–0.68)
EOSINOPHIL NFR BLD AUTO: 3 % (ref 0–6)
ERYTHROCYTE [DISTWIDTH] IN BLOOD BY AUTOMATED COUNT: 18.9 % (ref 11.7–14.2)
GLUCOSE SERPL-MCNC: 87 MG/DL (ref 70–99)
HCT VFR BLD AUTO: 26.9 % (ref 37–53)
HCT VFR BLD AUTO: 26.9 % (ref 37–53)
HCT VFR BLD AUTO: 27.7 % (ref 37–53)
HGB BLD-MCNC: 8.5 G/DL (ref 13.5–17.5)
HGB BLD-MCNC: 8.5 G/DL (ref 13.5–17.5)
HGB BLD-MCNC: 8.8 G/DL (ref 13.5–17.5)
IMM GRANULOCYTES # BLD AUTO: 0.02 K/MM3 (ref 0–0.1)
IMM GRANULOCYTES NFR BLD AUTO: 1 % (ref 0–1)
LYMPHOCYTES # BLD AUTO: 0.73 K/MM3 (ref 0.84–5.2)
LYMPHOCYTES NFR BLD AUTO: 29 % (ref 21–46)
MCHC RBC AUTO-ENTMCNC: 31.6 G/DL (ref 31.5–36.5)
MCV RBC AUTO: 99 FL (ref 80–100)
MONOCYTES # BLD AUTO: 0.12 K/MM3 (ref 0.16–1.47)
MONOCYTES NFR BLD AUTO: 5 % (ref 4–13)
NEUTROPHILS # BLD AUTO: 1.54 K/MM3 (ref 1.96–9.15)
NEUTROPHILS NFR BLD AUTO: 62 % (ref 41–73)
NRBC # BLD AUTO: 0 K/MM3 (ref 0–0.02)
NRBC BLD AUTO-RTO: 0 /100 WBC (ref 0–0.2)
PHOSPHATE SERPL-MCNC: 3.3 MG/DL (ref 2.5–4.9)
PLATELET # BLD AUTO: 108 K/MM3 (ref 150–400)
POTASSIUM SERPL-SCNC: 4.3 MMOL/L (ref 3.5–5.5)
SODIUM SERPL-SCNC: 137 MMOL/L (ref 136–145)

## 2019-01-09 NOTE — NUR
SHIFT SUMMARY
 
PT RECIEVED BEDBATH AND LINEN CHANGE AND HAS BEEN SITTING IN RECLINER CHAIR
SINCE. UP TO BEDSIDE COMMODE WITH MINIMAL ASSIST. C/O MODERATE L SHOULDER
PAIN; LIDOCAINE PATCH APPLIED AND PAIN IMPROVED. VSS ENTIRE SHIFT AND
AFEBRILE. VISITED BY FAMILY OFF/ON DURING AFTERNOON. PROTONIX GTT INFUSED AT
10 ML/HR ENTIRE SHIFT AND REMAINS INFUSING. WILL GIVE BEDSIDE, HANDOFF REPORT
TO NOC RN.

## 2019-01-09 NOTE — NUR
[place] [facility]:
 
Patient alert and oriented x4, sitting upright in chair watching tv, family in
room. Denies pain, discomfort, SOB, dyspnea, VSS. No s/s of active GI bleed at
this time, denies N/V. Protonix infusing to rt AC without difficulty. Call
light in reach, makes needs known. Will continue to monitor for pain, comfort,
safety. Next H+H scheduled for 2100hr.

## 2019-01-09 NOTE — NUR
CARE ASSUMED
 
CARE AND REPORT ASSUMED FROM GIORGIO PATE RN. PT UP TO BEDSIDE COMMODE WITH 1
PERSON ASSIST. VSS. NSR, HR 70S. BP STABLE. DENIES PAIN AT THIS TIME. NS MIV
DISCONTINUED BY MD EARLY. PROTONIX GTT REMAINS INFUSING AT 10 ML/HR PER
ORDER. AFEBRILE. PT NOW SITTING UP WATCHING TV. WILL CONTINUE TO MONITOR.

## 2019-01-09 NOTE — NUR
REASSESSMENT
 
PT REMAINS SITTING UP IN RECLINER CHAIR. UP TO BEDSIDE COMMODE WITH 1 ASSIST.
DENIES PAIN AT THIS TIME. VSS. NSR, HR 70-80S. BP STABLE. AFEBRILE. CURRENTLY
SIPPING ON HOT CHOCOLATE AND WATCHING TV. PROTONIX GTT REMAINS INFUSING AT 10
ML/HR PER ORDER. WILL CONTINUE TO MONITOR.

## 2019-01-10 LAB
ALBUMIN SERPL BCP-MCNC: 2.1 G/DL (ref 3.4–5)
ANION GAP SERPL CALCULATED.4IONS-SCNC: 3 MMOL/L (ref 6–16)
BASOPHILS # BLD AUTO: 0 K/MM3 (ref 0–0.23)
BASOPHILS NFR BLD AUTO: 0 % (ref 0–2)
BUN SERPL-MCNC: 8 MG/DL (ref 8–24)
CALCIUM SERPL-MCNC: 7.6 MG/DL (ref 8.5–10.1)
CHLORIDE SERPL-SCNC: 105 MMOL/L (ref 98–108)
CO2 SERPL-SCNC: 29 MMOL/L (ref 21–32)
CREAT SERPL-MCNC: 0.89 MG/DL (ref 0.6–1.2)
DEPRECATED RDW RBC AUTO: 65.9 FL (ref 35.1–46.3)
EOSINOPHIL # BLD AUTO: 0.07 K/MM3 (ref 0–0.68)
EOSINOPHIL NFR BLD AUTO: 3 % (ref 0–6)
ERYTHROCYTE [DISTWIDTH] IN BLOOD BY AUTOMATED COUNT: 18.4 % (ref 11.7–14.2)
GLUCOSE SERPL-MCNC: 84 MG/DL (ref 70–99)
HCT VFR BLD AUTO: 27.6 % (ref 37–53)
HGB BLD-MCNC: 8.8 G/DL (ref 13.5–17.5)
IMM GRANULOCYTES # BLD AUTO: 0.02 K/MM3 (ref 0–0.1)
IMM GRANULOCYTES NFR BLD AUTO: 1 % (ref 0–1)
LYMPHOCYTES # BLD AUTO: 0.81 K/MM3 (ref 0.84–5.2)
LYMPHOCYTES NFR BLD AUTO: 33 % (ref 21–46)
MCHC RBC AUTO-ENTMCNC: 31.9 G/DL (ref 31.5–36.5)
MCV RBC AUTO: 100 FL (ref 80–100)
MONOCYTES # BLD AUTO: 0.13 K/MM3 (ref 0.16–1.47)
MONOCYTES NFR BLD AUTO: 5 % (ref 4–13)
NEUTROPHILS # BLD AUTO: 1.41 K/MM3 (ref 1.96–9.15)
NEUTROPHILS NFR BLD AUTO: 58 % (ref 41–73)
NRBC # BLD AUTO: 0 K/MM3 (ref 0–0.02)
NRBC BLD AUTO-RTO: 0 /100 WBC (ref 0–0.2)
PHOSPHATE SERPL-MCNC: 3.7 MG/DL (ref 2.5–4.9)
PLATELET # BLD AUTO: 110 K/MM3 (ref 150–400)
POTASSIUM SERPL-SCNC: 4.3 MMOL/L (ref 3.5–5.5)
SODIUM SERPL-SCNC: 137 MMOL/L (ref 136–145)

## 2019-01-10 NOTE — NUR
REPORT CALLED TO [facility]. RUTH'S DC'D BY CHARGE RN WNL. TRANSFERRED VIA WHEEL
CHAIR. FAMILY AWARE THAT PT IS BEING DISCHARGED BACK TO . NO FURTHER
QUESTIONS OR CONCERNS.

## 2019-01-10 NOTE — NUR
Shift Summary:
 
Patient slept well from approx 2300hr throughout remainder of shift. Continues
to deny pain, discomfort, SOB, or dyspnea throughout shift, VSS. No s/s of
acitive GI bleed, x2 dark brown, soft, small BM's this shift, H+H remains
stable. Transferring to bedside commode via one person stand-by-assist (with
cords) without difficulty. Protonix gtt infusing to rt AC. Calm and
cooperative with staff, makes needs known. Will continue to monitor until
report to day shift RN.

## 2019-01-17 ENCOUNTER — HOSPITAL ENCOUNTER (EMERGENCY)
Dept: HOSPITAL 95 - ER | Age: 84
Discharge: HOME | End: 2019-01-17
Payer: MEDICARE

## 2019-01-17 VITALS — BODY MASS INDEX: 23.48 KG/M2 | WEIGHT: 164 LBS | HEIGHT: 70 IN

## 2019-01-17 DIAGNOSIS — I10: ICD-10-CM

## 2019-01-17 DIAGNOSIS — R60.0: Primary | ICD-10-CM

## 2019-01-17 DIAGNOSIS — Z87.891: ICD-10-CM

## 2019-01-17 DIAGNOSIS — E11.9: ICD-10-CM

## 2019-01-17 DIAGNOSIS — Z79.899: ICD-10-CM

## 2019-01-17 DIAGNOSIS — D61.818: ICD-10-CM

## 2019-01-17 LAB
ALBUMIN SERPL BCP-MCNC: 2.4 G/DL (ref 3.4–5)
ALBUMIN/GLOB SERPL: 0.4 {RATIO} (ref 0.8–1.8)
ALT SERPL W P-5'-P-CCNC: 14 U/L (ref 12–78)
ANION GAP SERPL CALCULATED.4IONS-SCNC: 5 MMOL/L (ref 6–16)
AST SERPL W P-5'-P-CCNC: 22 U/L (ref 12–37)
BASOPHILS # BLD AUTO: 0 K/MM3 (ref 0–0.23)
BASOPHILS NFR BLD AUTO: 0 % (ref 0–2)
BILIRUB SERPL-MCNC: 0.2 MG/DL (ref 0.1–1)
BUN SERPL-MCNC: 16 MG/DL (ref 8–24)
CALCIUM SERPL-MCNC: 7.9 MG/DL (ref 8.5–10.1)
CHLORIDE SERPL-SCNC: 102 MMOL/L (ref 98–108)
CO2 SERPL-SCNC: 28 MMOL/L (ref 21–32)
CREAT SERPL-MCNC: 1.14 MG/DL (ref 0.6–1.2)
DEPRECATED RDW RBC AUTO: 64.7 FL (ref 35.1–46.3)
EOSINOPHIL # BLD AUTO: 0.04 K/MM3 (ref 0–0.68)
EOSINOPHIL NFR BLD AUTO: 2 % (ref 0–6)
ERYTHROCYTE [DISTWIDTH] IN BLOOD BY AUTOMATED COUNT: 17.8 % (ref 11.7–14.2)
GLOBULIN SER CALC-MCNC: 6.1 G/DL (ref 2.2–4)
GLUCOSE SERPL-MCNC: 123 MG/DL (ref 70–99)
HCT VFR BLD AUTO: 26.6 % (ref 37–53)
HGB BLD-MCNC: 8.2 G/DL (ref 13.5–17.5)
IMM GRANULOCYTES # BLD AUTO: 0.01 K/MM3 (ref 0–0.1)
IMM GRANULOCYTES NFR BLD AUTO: 0 % (ref 0–1)
LYMPHOCYTES # BLD AUTO: 0.8 K/MM3 (ref 0.84–5.2)
LYMPHOCYTES NFR BLD AUTO: 29 % (ref 21–46)
MCHC RBC AUTO-ENTMCNC: 30.8 G/DL (ref 31.5–36.5)
MCV RBC AUTO: 101 FL (ref 80–100)
MONOCYTES # BLD AUTO: 0.18 K/MM3 (ref 0.16–1.47)
MONOCYTES NFR BLD AUTO: 7 % (ref 4–13)
NEUTROPHILS # BLD AUTO: 1.69 K/MM3 (ref 1.96–9.15)
NEUTROPHILS NFR BLD AUTO: 62 % (ref 41–73)
NRBC # BLD AUTO: 0 K/MM3 (ref 0–0.02)
NRBC BLD AUTO-RTO: 0 /100 WBC (ref 0–0.2)
PLATELET # BLD AUTO: 120 K/MM3 (ref 150–400)
POTASSIUM SERPL-SCNC: 4.1 MMOL/L (ref 3.5–5.5)
PROT SERPL-MCNC: 8.5 G/DL (ref 6.4–8.2)
SODIUM SERPL-SCNC: 135 MMOL/L (ref 136–145)

## 2019-01-29 ENCOUNTER — HOSPITAL ENCOUNTER (EMERGENCY)
Dept: HOSPITAL 95 - ER | Age: 84
Discharge: HOME | End: 2019-01-29
Payer: MEDICARE

## 2019-01-29 VITALS — BODY MASS INDEX: 45.1 KG/M2 | HEIGHT: 70 IN | WEIGHT: 315 LBS

## 2019-01-29 DIAGNOSIS — Z79.899: ICD-10-CM

## 2019-01-29 DIAGNOSIS — I10: ICD-10-CM

## 2019-01-29 DIAGNOSIS — E11.9: ICD-10-CM

## 2019-01-29 DIAGNOSIS — Z87.891: ICD-10-CM

## 2019-01-29 DIAGNOSIS — Z88.1: ICD-10-CM

## 2019-01-29 DIAGNOSIS — Z79.82: ICD-10-CM

## 2019-01-29 DIAGNOSIS — D64.9: ICD-10-CM

## 2019-01-29 DIAGNOSIS — R04.0: Primary | ICD-10-CM

## 2019-01-29 LAB
BASOPHILS # BLD AUTO: 0 K/MM3 (ref 0–0.23)
BASOPHILS NFR BLD AUTO: 0 % (ref 0–2)
DEPRECATED RDW RBC AUTO: 75.5 FL (ref 35.1–46.3)
EOSINOPHIL # BLD AUTO: 0.04 K/MM3 (ref 0–0.68)
EOSINOPHIL NFR BLD AUTO: 1 % (ref 0–6)
ERYTHROCYTE [DISTWIDTH] IN BLOOD BY AUTOMATED COUNT: 19.8 % (ref 11.7–14.2)
HCT VFR BLD AUTO: 23 % (ref 37–53)
HGB BLD-MCNC: 6.9 G/DL (ref 13.5–17.5)
IMM GRANULOCYTES # BLD AUTO: 0.02 K/MM3 (ref 0–0.1)
IMM GRANULOCYTES NFR BLD AUTO: 1 % (ref 0–1)
LYMPHOCYTES # BLD AUTO: 0.79 K/MM3 (ref 0.84–5.2)
LYMPHOCYTES NFR BLD AUTO: 24 % (ref 21–46)
MCHC RBC AUTO-ENTMCNC: 30 G/DL (ref 31.5–36.5)
MCV RBC AUTO: 106 FL (ref 80–100)
MONOCYTES # BLD AUTO: 0.15 K/MM3 (ref 0.16–1.47)
MONOCYTES NFR BLD AUTO: 5 % (ref 4–13)
NEUTROPHILS # BLD AUTO: 2.24 K/MM3 (ref 1.96–9.15)
NEUTROPHILS NFR BLD AUTO: 69 % (ref 41–73)
NRBC # BLD AUTO: 0 K/MM3 (ref 0–0.02)
NRBC BLD AUTO-RTO: 0 /100 WBC (ref 0–0.2)
PLATELET # BLD AUTO: 135 K/MM3 (ref 150–400)

## 2019-01-29 PROCEDURE — P9016 RBC LEUKOCYTES REDUCED: HCPCS

## 2019-04-01 ENCOUNTER — HOSPITAL ENCOUNTER (INPATIENT)
Dept: HOSPITAL 95 - ER | Age: 84
LOS: 3 days | Discharge: HOSPICE | DRG: 308 | End: 2019-04-04
Attending: FAMILY MEDICINE | Admitting: FAMILY MEDICINE
Payer: MEDICARE

## 2019-04-01 VITALS — WEIGHT: 177.03 LBS | HEIGHT: 70 IN | BODY MASS INDEX: 25.34 KG/M2

## 2019-04-01 DIAGNOSIS — Z79.02: ICD-10-CM

## 2019-04-01 DIAGNOSIS — I25.5: ICD-10-CM

## 2019-04-01 DIAGNOSIS — I49.3: ICD-10-CM

## 2019-04-01 DIAGNOSIS — E11.9: ICD-10-CM

## 2019-04-01 DIAGNOSIS — Z66: ICD-10-CM

## 2019-04-01 DIAGNOSIS — Z95.5: ICD-10-CM

## 2019-04-01 DIAGNOSIS — E03.9: ICD-10-CM

## 2019-04-01 DIAGNOSIS — I25.2: ICD-10-CM

## 2019-04-01 DIAGNOSIS — I48.0: Primary | ICD-10-CM

## 2019-04-01 DIAGNOSIS — K21.9: ICD-10-CM

## 2019-04-01 DIAGNOSIS — Z85.828: ICD-10-CM

## 2019-04-01 DIAGNOSIS — I25.10: ICD-10-CM

## 2019-04-01 DIAGNOSIS — I11.0: ICD-10-CM

## 2019-04-01 DIAGNOSIS — Z87.891: ICD-10-CM

## 2019-04-01 DIAGNOSIS — I50.23: ICD-10-CM

## 2019-04-01 DIAGNOSIS — G47.00: ICD-10-CM

## 2019-04-01 DIAGNOSIS — D69.6: ICD-10-CM

## 2019-04-01 DIAGNOSIS — T45.8X5A: ICD-10-CM
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
## 2019-04-01 DIAGNOSIS — E78.5: ICD-10-CM

## 2019-04-01 DIAGNOSIS — C91.90: ICD-10-CM

## 2019-04-01 DIAGNOSIS — Z79.82: ICD-10-CM

## 2019-04-01 LAB
ALBUMIN SERPL BCP-MCNC: 2.5 G/DL (ref 3.4–5)
ALBUMIN/GLOB SERPL: 0.5 {RATIO} (ref 0.8–1.8)
ALT SERPL W P-5'-P-CCNC: 18 U/L (ref 12–78)
ANION GAP SERPL CALCULATED.4IONS-SCNC: 5 MMOL/L (ref 6–16)
AST SERPL W P-5'-P-CCNC: 19 U/L (ref 12–37)
BASOPHILS # BLD: 0 K/MM3 (ref 0–0.23)
BASOPHILS NFR BLD: 0 % (ref 0–2)
BILIRUB SERPL-MCNC: 0.2 MG/DL (ref 0.1–1)
BUN SERPL-MCNC: 23 MG/DL (ref 8–24)
CALCIUM SERPL-MCNC: 7.6 MG/DL (ref 8.5–10.1)
CHLORIDE SERPL-SCNC: 102 MMOL/L (ref 98–108)
CO2 SERPL-SCNC: 25 MMOL/L (ref 21–32)
CREAT SERPL-MCNC: 0.66 MG/DL (ref 0.6–1.2)
DEPRECATED RDW RBC AUTO: 79 FL (ref 35.1–46.3)
EOSINOPHIL # BLD: 0 K/MM3 (ref 0–0.68)
EOSINOPHIL NFR BLD: 0 % (ref 0–6)
ERYTHROCYTE [DISTWIDTH] IN BLOOD BY AUTOMATED COUNT: 19.6 % (ref 11.7–14.2)
GLOBULIN SER CALC-MCNC: 5.5 G/DL (ref 2.2–4)
GLUCOSE SERPL-MCNC: 162 MG/DL (ref 70–99)
HCT VFR BLD AUTO: 24 % (ref 37–53)
HGB BLD-MCNC: 7.3 G/DL (ref 13.5–17.5)
LYMPHOCYTES # BLD: 0.73 K/MM3 (ref 0.84–5.2)
LYMPHOCYTES NFR BLD: 15 % (ref 21–46)
MCHC RBC AUTO-ENTMCNC: 30.4 G/DL (ref 31.5–36.5)
MCV RBC AUTO: 109 FL (ref 80–100)
METAMYELOCYTES # BLD MANUAL: 0.04 K/MM3 (ref 0–0)
METAMYELOCYTES NFR BLD MANUAL: 1 % (ref 0–0)
MONOCYTES # BLD: 0.09 K/MM3 (ref 0.16–1.47)
MONOCYTES NFR BLD: 2 % (ref 4–13)
NEUTS BAND NFR BLD MANUAL: 3 % (ref 0–8)
NEUTS SEG # BLD MANUAL: 3.71 K/MM3 (ref 1.96–9.15)
NEUTS SEG NFR BLD MANUAL: 78 % (ref 41–73)
NRBC # BLD AUTO: 0 K/MM3 (ref 0–0.02)
NRBC BLD AUTO-RTO: 0 /100 WBC (ref 0–0.2)
PLATELET # BLD AUTO: 31 K/MM3 (ref 150–400)
POTASSIUM SERPL-SCNC: 4.5 MMOL/L (ref 3.5–5.5)
PROT SERPL-MCNC: 8 G/DL (ref 6.4–8.2)
SODIUM SERPL-SCNC: 132 MMOL/L (ref 136–145)
TOTAL CELLS COUNTED BLD: 100
TROPONIN I SERPL-MCNC: 0.02 NG/ML (ref 0–0.04)
VARIANT LYMPHS NFR BLD MANUAL: 1 % (ref 0–0)

## 2019-04-01 PROCEDURE — C8929 TTE W OR WO FOL WCON,DOPPLER: HCPCS

## 2019-04-01 PROCEDURE — 30233N1 TRANSFUSION OF NONAUTOLOGOUS RED BLOOD CELLS INTO PERIPHERAL VEIN, PERCUTANEOUS APPROACH: ICD-10-PCS | Performed by: FAMILY MEDICINE

## 2019-04-01 PROCEDURE — G0378 HOSPITAL OBSERVATION PER HR: HCPCS

## 2019-04-01 PROCEDURE — P9016 RBC LEUKOCYTES REDUCED: HCPCS

## 2019-04-01 NOTE — NUR
PT ARRIVES TO ICU 5 VIA GURNEY FROM ER FOR DIAGNOSIS OF AFIB WITH RVR, SINUS
RHYTHM ON MONITOR RATE 80S, PT DENIES PAIN, DENIES NAUSEA, DENIES SOB/DYSPNEA,
DENIES CP/PRESSURE. TRACE EDEMA NOTED TO BILAT LOWER EXTREMITIES. FAMILY AT
BEDSIDE.

## 2019-04-02 LAB
ALBUMIN SERPL BCP-MCNC: 2 G/DL (ref 3.4–5)
ALBUMIN/GLOB SERPL: 0.4 {RATIO} (ref 0.8–1.8)
ALT SERPL W P-5'-P-CCNC: 16 U/L (ref 12–78)
ANION GAP SERPL CALCULATED.4IONS-SCNC: 6 MMOL/L (ref 6–16)
AST SERPL W P-5'-P-CCNC: 16 U/L (ref 12–37)
BILIRUB SERPL-MCNC: 0.4 MG/DL (ref 0.1–1)
BUN SERPL-MCNC: 20 MG/DL (ref 8–24)
CALCIUM SERPL-MCNC: 7.5 MG/DL (ref 8.5–10.1)
CHLORIDE SERPL-SCNC: 105 MMOL/L (ref 98–108)
CO2 SERPL-SCNC: 25 MMOL/L (ref 21–32)
CREAT SERPL-MCNC: 0.74 MG/DL (ref 0.6–1.2)
GLOBULIN SER CALC-MCNC: 5 G/DL (ref 2.2–4)
GLUCOSE SERPL-MCNC: 90 MG/DL (ref 70–99)
POTASSIUM SERPL-SCNC: 4.1 MMOL/L (ref 3.5–5.5)
PROT SERPL-MCNC: 7 G/DL (ref 6.4–8.2)
SODIUM SERPL-SCNC: 136 MMOL/L (ref 136–145)
TSH SERPL DL<=0.005 MIU/L-ACNC: 3.28 UIU/ML (ref 0.36–4.8)

## 2019-04-02 NOTE — NUR
COVERTED BACK TO AFIB
TELE MONITOR CALLED [facility] PT COVERTED BACK TO AFIB AT 2140, CURRENT RATE IS
102. CARDIZEM PO WAS GIVEN PER EMAR. PT IS ASYMPTOMATIC. WILL CONT TO MONITOR
RHYTHM AND RATE.

## 2019-04-02 NOTE — NUR
SUMMARY
PT DENIED ANY CHEST PAIN TODAY, STATES "I FEEL PRETTY GOOD" EATING DINNER,
TOLERATED WELL, DENIES ANY SOB, FAMILY AT BEDSIDE, MET WITH DR. MARMOLEJO TO
DISCUSS PT'S CODE STATUS, PT WAS MADE DNR, NO ACUTE CHANGES THIS SHIFT.

## 2019-04-02 NOTE — NUR
PT WAS AN ICU TRANSFER THIS SHIFT. PT REMAINS ON TELE IN NSR, VSS, DENIES ANY
CHEST PAIN OR PALPATATIONS. BIGGEST COMLAINT WAS LEFT CHRONIC SHOULDER PAIN
WHICH RESOLVED MOSTLY WITH LIDOCAINE PATCH. CALL LIGHT IN REACH.

## 2019-04-02 NOTE — NUR
Mr. Clifton was tearful throughout conversation.  He appears to be still
actively grieving his wife who passed ten years ago.  He is also grieving the
loss of his health.  He told me about his multiple health issues, including
bone cancer.  He admits to being in pain "most of the time."  He tells me he
feels that he is nearing end-of-life.  He is not fearful of death as he has a
strong noah in a loving God and looks forward to an afterlife with his wife.
He says he is worried about upsetting his adult children.  Apparently, they
have not spoken to or had an in-depth conversation with pt regarding his
chronic pain and lack of medical solutions to his multiple co-morbidities.
Mr. Clifton takes a great deal of pride in the fact that he supported his
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  It has been
difficult for him to be restrained from the things he loves doing because of
his health.  Clearly, his code status needs to be addressed with his children
present.  He tells me he has "good kids" and he is proud they are successful,
independant and thriving.  Per face sheet, there is supposedly an AD on file.
Regardless, code status should be addressed this admission.  He asked for
prayer and spiritual direction.  I was honored to provide these to this gentle
man.  He appeared to enjoy being heard, understood and affirmed.  His
son-in-law arrived and Mr. Clifton did not wish to continue this difficult
converation with him present.   services will remain available.

## 2019-04-02 NOTE — NUR
PT ARRIVED TO ROOM 228 FROM ICU. PT WAS ABLE TO STAND AND AMBULATE TO NEW BED
WITHOUT ANY DIFFICULTY. DENIES ANY CHEST PAIN DENIES SOB. ORIENTED TO NEW
ROOM. CALLED TELE FOR NEW BOX. VSS.

## 2019-04-03 LAB
ALBUMIN SERPL-MCNC: 2.7 G/DL (ref 2.9–4.4)
ALBUMIN/GLOB SERPL: 0.6 {RATIO} (ref 0.7–1.7)
ALPHA1 GLOB SERPL ELPH-MCNC: 0.3 G/DL (ref 0–0.4)
ALPHA2 GLOB SERPL ELPH-MCNC: 0.8 G/DL (ref 0.4–1)
ANION GAP SERPL CALCULATED.4IONS-SCNC: 5 MMOL/L (ref 6–16)
B-GLOBULIN SERPL ELPH-MCNC: 0.7 G/DL (ref 0.7–1.3)
BASOPHILS # BLD: 0.02 K/MM3 (ref 0–0.23)
BASOPHILS NFR BLD: 1 % (ref 0–2)
BUN SERPL-MCNC: 22 MG/DL (ref 8–24)
CALCIUM SERPL-MCNC: 8 MG/DL (ref 8.5–10.1)
CHLORIDE SERPL-SCNC: 100 MMOL/L (ref 98–108)
CO2 SERPL-SCNC: 30 MMOL/L (ref 21–32)
CREAT SERPL-MCNC: 0.77 MG/DL (ref 0.6–1.2)
DEPRECATED RDW RBC AUTO: 72.8 FL (ref 35.1–46.3)
DEPRECATED RDW RBC AUTO: 74.3 FL (ref 35.1–46.3)
EOSINOPHIL # BLD: 0.02 K/MM3 (ref 0–0.68)
EOSINOPHIL NFR BLD: 1 % (ref 0–6)
ERYTHROCYTE [DISTWIDTH] IN BLOOD BY AUTOMATED COUNT: 19.5 % (ref 11.7–14.2)
ERYTHROCYTE [DISTWIDTH] IN BLOOD BY AUTOMATED COUNT: 20.1 % (ref 11.7–14.2)
GAMMA GLOB SERPL ELPH-MCNC: 2.5 G/DL (ref 0.4–1.8)
GLOBULIN SER CALC-MCNC: 4.3 G/DL (ref 2.2–3.9)
GLUCOSE SERPL-MCNC: 90 MG/DL (ref 70–99)
HCT VFR BLD AUTO: 25 % (ref 37–53)
HCT VFR BLD AUTO: 27.8 % (ref 37–53)
HGB BLD-MCNC: 7.6 G/DL (ref 13.5–17.5)
HGB BLD-MCNC: 8.7 G/DL (ref 13.5–17.5)
LYMPHOCYTES # BLD: 0.53 K/MM3 (ref 0.84–5.2)
LYMPHOCYTES NFR BLD: 18 % (ref 21–46)
M PROTEIN SERPL ELPH-MCNC: 2.3 G/DL
MCHC RBC AUTO-ENTMCNC: 30.4 G/DL (ref 31.5–36.5)
MCHC RBC AUTO-ENTMCNC: 31.3 G/DL (ref 31.5–36.5)
MCV RBC AUTO: 102 FL (ref 80–100)
MCV RBC AUTO: 103 FL (ref 80–100)
METAMYELOCYTES # BLD MANUAL: 0.02 K/MM3 (ref 0–0)
METAMYELOCYTES NFR BLD MANUAL: 1 % (ref 0–0)
MONOCYTES # BLD: 0.11 K/MM3 (ref 0.16–1.47)
MONOCYTES NFR BLD: 4 % (ref 4–13)
NEUTS BAND NFR BLD MANUAL: 2 % (ref 0–8)
NEUTS SEG # BLD MANUAL: 2.22 K/MM3 (ref 1.96–9.15)
NEUTS SEG NFR BLD MANUAL: 73 % (ref 41–73)
NRBC # BLD AUTO: 0 K/MM3 (ref 0–0.02)
NRBC # BLD AUTO: 0 K/MM3 (ref 0–0.02)
NRBC BLD AUTO-RTO: 0 /100 WBC (ref 0–0.2)
NRBC BLD AUTO-RTO: 0 /100 WBC (ref 0–0.2)
PLATELET # BLD AUTO: 44 K/MM3 (ref 150–400)
PLATELET # BLD AUTO: 48 K/MM3 (ref 150–400)
POTASSIUM SERPL-SCNC: 4.1 MMOL/L (ref 3.5–5.5)
PROT SERPL-MCNC: 7 G/DL (ref 6–8.5)
SODIUM SERPL-SCNC: 135 MMOL/L (ref 136–145)
TOTAL CELLS COUNTED BLD: 100

## 2019-04-03 NOTE — NUR
PT DID WELL DURING NIGHT. WAS ABLE TO SLEEP T/O MOST OF SHIFT. PT DID COVERT
TO AFIB FOR APPROX 2 HOURS LAST NIGHT THEN CONVERTED BACK TO NSR. REPEAT EKG
WAS DONE THIS AM AND ON THE FRONT OF CHART. PT DENIES ANY NEEDS OR COMPLAINTS.
HAS BEEN GETTING UP AND AMULATING IN ROOM, DENIES CHEST PAIN/PALPATATIONS.
CALL LIGHT IN REACH, WILL REPORT OFF TO NEXT SHIFT.

## 2019-04-03 NOTE — NUR
Initial Visit:
 
Palliative Care Consult for goals of care.
 
Pt is A&Ox4 and reports a tolerable pain level of 3/10 in his left shoulder.
Pt denies anxiety, dyspnea, and depression at this time.
 
Engaged in therapeutic conversation regarding goals of care. Pt reports that
his daughter Mel lives with him and help with any care needs he may require.
He is of Yarsani noah and reports that he has 5 adult children and many
grandchildren. He reports adequate support at home. Pt reports that he now
requires the use of a walker to assist with ambulation. He reports still being
able to bathe himself but does experience difficulty with dressing him self.
Pt denies incontinence. Listened as Pt expresses fears and concerns regarding
his disease process. Pt reports his biggest concern is the unknown when
passing away. He admits that he has a strong belief in God but there is still
an unknown factor. He states that he knows the end is near and his goal is to
pass away at home. He states " I appreciate receiving the transfusion but it
has to end sometime". Discussed the option for hospice and he reports
experiencing hospice with his wife 10 years ago. He reports his experience was
less than adequate stating that he had an issue with one of the nurses and one
of the bathaides. Pt reports that he still wants to pass away at home but is
unsure if he wants hospice due to past experience. Suggested that he has the
option to request a different nurse with hospice if he has a poor experience.
Pt reports no other concerns at this time. Received verbal permission to call
and talk with his daughter Mel.
 
Called and spoke with Mel and asked if she has any questions or concerns
regarding Pt's prognosis or disease process and Pt's goal to pass away at
home. Mel reports that her and other family members have been preparing
themselves for the day he made this decsion. Discussed with Mel regarding
Pt's concern and past experience with hospice. Mel reports that she will
have a discussion with her dad and feels that Pt would benefit from hospice
services. Educated Mel on hospice philosophy with V/U made by Mel.
Verbalized contact information with Mel for palliative care and also
intructed to let Pt's nurse know to contact palliative care with any questions
or concerns.
 
Plan: Will remain available for therapeutic visits.

## 2019-04-03 NOTE — NUR
SUMMARY
PALLIATIVE CARE RN IN TO SEE PATIENT, AFTER RN LEFT PATIENT STATES "I DONT
KNOW WHY THEY KEEP PUSHING HOSPICE ON ME, I HAD IT FOR MY WIFE AND IT WAS NO
USE". DISCUSSED WITH PATIENT SERVICES THAT HOSPICE PROVIDES AND PATIENT TELLS
ME HE WILL DISCUSS IT WITH HIS DAUGHTER. PATIENT DENIES ANY PAIN AT THIS TIME
AND IS HOPING TO BE DISCHARGED IN THE MORNING

## 2019-04-04 LAB
ANION GAP SERPL CALCULATED.4IONS-SCNC: 5 MMOL/L (ref 6–16)
BASOPHILS # BLD AUTO: 0 K/MM3 (ref 0–0.23)
BASOPHILS NFR BLD AUTO: 0 % (ref 0–2)
BUN SERPL-MCNC: 20 MG/DL (ref 8–24)
CALCIUM SERPL-MCNC: 7.8 MG/DL (ref 8.5–10.1)
CHLORIDE SERPL-SCNC: 97 MMOL/L (ref 98–108)
CO2 SERPL-SCNC: 30 MMOL/L (ref 21–32)
CREAT SERPL-MCNC: 0.81 MG/DL (ref 0.6–1.2)
DEPRECATED RDW RBC AUTO: 72.5 FL (ref 35.1–46.3)
EOSINOPHIL # BLD AUTO: 0.03 K/MM3 (ref 0–0.68)
EOSINOPHIL NFR BLD AUTO: 1 % (ref 0–6)
ERYTHROCYTE [DISTWIDTH] IN BLOOD BY AUTOMATED COUNT: 19.9 % (ref 11.7–14.2)
GLUCOSE SERPL-MCNC: 102 MG/DL (ref 70–99)
HCT VFR BLD AUTO: 28 % (ref 37–53)
HGB BLD-MCNC: 8.8 G/DL (ref 13.5–17.5)
IMM GRANULOCYTES # BLD AUTO: 0.02 K/MM3 (ref 0–0.1)
IMM GRANULOCYTES NFR BLD AUTO: 1 % (ref 0–1)
LYMPHOCYTES # BLD AUTO: 0.81 K/MM3 (ref 0.84–5.2)
LYMPHOCYTES NFR BLD AUTO: 23 % (ref 21–46)
MCHC RBC AUTO-ENTMCNC: 31.4 G/DL (ref 31.5–36.5)
MCV RBC AUTO: 101 FL (ref 80–100)
MONOCYTES # BLD AUTO: 0.23 K/MM3 (ref 0.16–1.47)
MONOCYTES NFR BLD AUTO: 7 % (ref 4–13)
NEUTROPHILS # BLD AUTO: 2.39 K/MM3 (ref 1.96–9.15)
NEUTROPHILS NFR BLD AUTO: 69 % (ref 41–73)
NRBC # BLD AUTO: 0 K/MM3 (ref 0–0.02)
NRBC BLD AUTO-RTO: 0 /100 WBC (ref 0–0.2)
PLATELET # BLD AUTO: 62 K/MM3 (ref 150–400)
POTASSIUM SERPL-SCNC: 4.6 MMOL/L (ref 3.5–5.5)
SODIUM SERPL-SCNC: 132 MMOL/L (ref 136–145)

## 2019-04-04 NOTE — NUR
DISCHARGE
PT DISCHARGED HOME FROM UNIT AT APROX 1512. PT HAD REQUESTED HOME ON HOSPICE
THIS AM THEN CHANGED MIND AND WAS DISCHARGED HOME WITH INFORMATION FOR HOSPICE
WHEN HE IS READY. REQUESTED PHARMACY TO CALL IN NEW MEDICATIONS-DAUGHTER
DECLINED AND STATED THAT SHE [facility] AND [facility]. DAUGHTER VERBALIZED UNDERSTANDING OF WRITTEN AND VERBAL
DISCHARGE INSTRUCTIONS. WHEELCHAIR TO CAR.

## 2019-04-04 NOTE — NUR
SUMMARY: NO ACUTE CHANGE TONIGHT. VSS, A/O. TELE NSR, NO REPORT OF AFIB. PT
HAS DENIED ANY DIZZINESS, SOB, OR CP. PT GIVEN LIDODERM PATCH FOR L SHOULDER,
OTHERWISE DENIED PAIN. NO CONCERNS AT THIS TIME, PLAN IS DC TODAY.

## 2019-04-04 NOTE — NUR
Spiritual care visit conducted. Patient is lying in bed and alert when I enter
the patient's room. Patient immediately says that he only has a few days left
to live. This information opened a long discussion about death and dying,
about noah and the afterlife and about finishing well. I listened
empathically, provided a calming presence, normalized patient's experience,
provided emotional support and prayer. Patient responded well and displayed
evidence of restored noah and peace.

## 2019-04-04 NOTE — NUR
Pt visit this afternoon. Stopped and talked with nurse [facility] before
entering Pt's room. She reports Dr Moise has placed discharge orders and Pt
daughter is at bedside.
 
Pt is resting in bed with daughter Mel at bedside. Pt denies pain at this
time and states that he is ready to go home. Mel reports that she would like
for the Pt have a hospice referral and will consider hospice at a later time
after further discussion with family and Pt. No other concerns reported at
this time.
 
Spoke with  Jody regarding Pt's wishes. Page reports 
[facility] will make a visit.
 
Will remain available.

## 2019-08-18 NOTE — NUR
PT DC'D 1142 WITH DC INSTRUCTIONS TO PT AND DAUGHTER. PT ESCORT OUTSIDE WITH
WHEELCHAIR DAUGHTER TO DRIVE HOME. PT'S HH STABALIZED. IV DC'D.

## 2019-08-18 NOTE — NUR
SHIFT SUMMARY
 
PT ER ADMIT THIS SHIFT FOR ANEMIA HE IS CURRENTLY RECEIVING ONE UNIT OF BLOOD
NOW THAT WAS STARTED IN THE ER. ADMISSION COMPLETE WITH PT SON AND DAUGHTER AT
BEDSIDE. MED REC IS STILL INCOMPLETE AT THIS TIME. SON STATES THAT HIS OTHER
DAUGHTER HAS THAT INFORMATION AND WILL BRING IT IN WHEN SHE COMES. WILL NOTIFY
DAYSHIFT RN FOR FOLLOW UP. PT A/OX4, PLESANT AND COOPERATIVE, HE DENIES PAIN.
PT RESTING IN BED COMFORTABLY. WILL CONTINUE TO MONITOR AND REPORT TO ONCOMING
RN.